# Patient Record
Sex: MALE | Race: WHITE
[De-identification: names, ages, dates, MRNs, and addresses within clinical notes are randomized per-mention and may not be internally consistent; named-entity substitution may affect disease eponyms.]

---

## 2021-02-18 ENCOUNTER — HOSPITAL ENCOUNTER (EMERGENCY)
Dept: HOSPITAL 8 - ED | Age: 46
Discharge: HOME | End: 2021-02-18
Payer: COMMERCIAL

## 2021-02-18 VITALS — WEIGHT: 187.39 LBS | BODY MASS INDEX: 26.83 KG/M2 | HEIGHT: 70 IN

## 2021-02-18 VITALS — DIASTOLIC BLOOD PRESSURE: 64 MMHG | SYSTOLIC BLOOD PRESSURE: 142 MMHG

## 2021-02-18 DIAGNOSIS — L02.31: Primary | ICD-10-CM

## 2021-02-18 DIAGNOSIS — M79.605: ICD-10-CM

## 2021-02-18 DIAGNOSIS — M79.89: ICD-10-CM

## 2021-02-18 PROCEDURE — 99284 EMERGENCY DEPT VISIT MOD MDM: CPT

## 2021-02-18 NOTE — NUR
f/u and d/c instructions given to pt and to [facility] escorting pt.  pt v/u.  
prescriptions for pain and for antibiotics also given.  pt d/c'd in custody.